# Patient Record
Sex: MALE | Race: WHITE | Employment: OTHER | URBAN - METROPOLITAN AREA
[De-identification: names, ages, dates, MRNs, and addresses within clinical notes are randomized per-mention and may not be internally consistent; named-entity substitution may affect disease eponyms.]

---

## 2019-03-14 PROBLEM — R06.02 SOB (SHORTNESS OF BREATH): Status: ACTIVE | Noted: 2019-03-14

## 2019-03-14 PROBLEM — I10 HYPERTENSION: Status: ACTIVE | Noted: 2019-03-14

## 2019-03-19 ENCOUNTER — HOSPITAL ENCOUNTER (OUTPATIENT)
Dept: CT IMAGING | Age: 64
Discharge: HOME | End: 2019-03-19
Attending: INTERNAL MEDICINE

## 2019-03-19 DIAGNOSIS — R07.2 PRECORDIAL PAIN: ICD-10-CM

## 2019-03-19 DIAGNOSIS — I10 ESSENTIAL HYPERTENSION: ICD-10-CM

## 2019-03-20 NOTE — PROGRESS NOTES
abnormal calcium score at 382  . moderate risk for CAD.   See if we can change his f/u appt to a EKG stress test with  me

## 2019-04-12 PROBLEM — R93.1 AGATSTON CORONARY ARTERY CALCIUM SCORE BETWEEN 200 AND 399: Status: ACTIVE | Noted: 2019-04-12

## 2019-04-12 PROBLEM — E78.2 MIXED HYPERLIPIDEMIA: Status: ACTIVE | Noted: 2019-04-12

## 2020-01-22 ENCOUNTER — HOSPITAL ENCOUNTER (OUTPATIENT)
Dept: LAB | Age: 65
Discharge: HOME | End: 2020-01-22

## 2020-01-22 PROCEDURE — 88305 TISSUE EXAM BY PATHOLOGIST: CPT
